# Patient Record
Sex: MALE | Race: WHITE | NOT HISPANIC OR LATINO | Employment: OTHER | URBAN - METROPOLITAN AREA
[De-identification: names, ages, dates, MRNs, and addresses within clinical notes are randomized per-mention and may not be internally consistent; named-entity substitution may affect disease eponyms.]

---

## 2021-02-09 ENCOUNTER — OFFICE VISIT (OUTPATIENT)
Dept: SURGERY | Facility: CLINIC | Age: 61
End: 2021-02-09
Payer: COMMERCIAL

## 2021-02-09 VITALS
DIASTOLIC BLOOD PRESSURE: 88 MMHG | WEIGHT: 196 LBS | RESPIRATION RATE: 17 BRPM | SYSTOLIC BLOOD PRESSURE: 131 MMHG | HEART RATE: 91 BPM | BODY MASS INDEX: 27.44 KG/M2 | HEIGHT: 71 IN

## 2021-02-09 DIAGNOSIS — K40.20 BILATERAL INGUINAL HERNIA WITHOUT OBSTRUCTION OR GANGRENE, RECURRENCE NOT SPECIFIED: Primary | ICD-10-CM

## 2021-02-09 PROCEDURE — 99204 PR OFFICE/OUTPT VISIT, NEW, LEVL IV, 45-59 MIN: ICD-10-PCS | Mod: S$GLB,,, | Performed by: SURGERY

## 2021-02-09 PROCEDURE — 1126F AMNT PAIN NOTED NONE PRSNT: CPT | Mod: S$GLB,,, | Performed by: SURGERY

## 2021-02-09 PROCEDURE — 3008F PR BODY MASS INDEX (BMI) DOCUMENTED: ICD-10-PCS | Mod: CPTII,S$GLB,, | Performed by: SURGERY

## 2021-02-09 PROCEDURE — 3008F BODY MASS INDEX DOCD: CPT | Mod: CPTII,S$GLB,, | Performed by: SURGERY

## 2021-02-09 PROCEDURE — 99204 OFFICE O/P NEW MOD 45 MIN: CPT | Mod: S$GLB,,, | Performed by: SURGERY

## 2021-02-09 PROCEDURE — 1126F PR PAIN SEVERITY QUANTIFIED, NO PAIN PRESENT: ICD-10-PCS | Mod: S$GLB,,, | Performed by: SURGERY

## 2021-02-19 LAB
ABS NRBC COUNT: 0 THOU/UL (ref 0–0.01)
ABSOLUTE BASOPHIL: 0 10*3/UL (ref 0–0.3)
ABSOLUTE EOSINOPHIL: 0.1 10*3/UL (ref 0–0.6)
ABSOLUTE IMMATURE GRAN: 0.01 THOU/UL (ref 0–0.03)
ABSOLUTE LYMPHOCYTE: 1.2 10*3/UL (ref 1.2–4)
ABSOLUTE MONOCYTE: 0.4 10*3/UL (ref 0.1–0.8)
ANION GAP SERPL CALC-SCNC: 3 MMOL/L (ref 3–11)
APTT PPP: 33.4 SEC (ref 25.1–36.5)
B PARAP IS1001 DNA: NOT DETECTED
B PERT.PT PROM REG: NOT DETECTED
BASOPHILS NFR BLD: 0.6 % (ref 0–3)
BUN SERPL-MCNC: 16 MG/DL (ref 7–18)
C PNEUM DNA: NOT DETECTED
CALCIUM BLD-MCNC: POSITIVE MG/DL
CALCIUM SERPL-MCNC: 8.7 MG/DL (ref 8.5–10.1)
CHLORIDE SERPL-SCNC: 104 MMOL/L (ref 98–107)
CO2 SERPL-SCNC: 32 MMOL/L (ref 21–32)
COVID-19 AB, IGM: POSITIVE
CREAT SERPL-MCNC: 1.06 MG/DL (ref 0.7–1.3)
EOSINOPHIL NFR BLD: 1.5 % (ref 0–6)
ERYTHROCYTE [DISTWIDTH] IN BLOOD BY AUTOMATED COUNT: 11.6 % (ref 0–15.5)
FLUAV RNA: NOT DETECTED
FLUBV RNA: NOT DETECTED
GFR ESTIMATION: > 60
GLUCOSE SERPL-MCNC: 99 MG/DL (ref 74–106)
HADV DNA: NOT DETECTED
HCOV 229E RNA: NOT DETECTED
HCOV HKU1 RNA: NOT DETECTED
HCOV NL63 RNA: NOT DETECTED
HCOV OC43 RNA: NOT DETECTED
HCT VFR BLD AUTO: 45.4 % (ref 42–52)
HGB BLD-MCNC: 15.4 G/DL (ref 14–18)
HMPV RNA: NOT DETECTED
HPIV1 RNA: NOT DETECTED
HPIV2 RNA: NOT DETECTED
HPIV3 RNA: NOT DETECTED
HPIV4 RNA: NOT DETECTED
IMMATURE GRANULOCYTES: 0.2 % (ref 0–0.43)
INR PPP: 1.2 INR (ref 0.9–1.1)
LYMPHOCYTES NFR BLD: 23.2 % (ref 20–45)
M PNEUMO DNA: NOT DETECTED
MCH RBC QN AUTO: 31.4 PG (ref 27–32)
MCHC RBC AUTO-ENTMCNC: 33.9 % (ref 32–36)
MCV RBC AUTO: 92.7 FL (ref 80–97)
MONOCYTES NFR BLD: 7.5 % (ref 2–10)
NEUTROPHILS # BLD AUTO: 3.5 10*3/UL (ref 1.4–7)
NEUTROPHILS NFR BLD: 67 % (ref 50–80)
NUCLEATED RED BLOOD CELLS: 0 % (ref 0–0.2)
PATIENT EMPLOYED IN HEALTHCARE: NO
PATIENT EMPLOYED IN HEALTHCARE: NO
PATIENT HAS SYMPTOMS FOR CONDITION OF INTEREST: NO
PATIENT HAS SYMPTOMS FOR CONDITION OF INTEREST: NO
PATIENT HOSPITALIZED BC COND: NO
PATIENT HOSPITALIZED BC COND: NO
PATIENT IN CONGREGATE CARE: NO
PATIENT IN CONGREGATE CARE: NO
PLATELETS: 174 10*3/UL (ref 130–400)
PMV BLD AUTO: 10.4 FL (ref 9.2–12.2)
POTASSIUM SERPL-SCNC: 4.2 MMOL/L (ref 3.5–5.1)
PROTHROMBIN TIME: 13.4 SEC (ref 10.2–12.9)
RBC # BLD AUTO: 4.9 10*6/UL (ref 4.7–6.1)
RSV RNA: NOT DETECTED
RV+EV RNA: NOT DETECTED
SARS-COV-2 RNA RESP QL NAA+PROBE: NOT DETECTED
SODIUM BLD-SCNC: 139 MMOL/L (ref 131–143)
WBC # BLD: 5.2 10*3/UL (ref 4.5–10)

## 2021-02-25 ENCOUNTER — OUTSIDE PLACE OF SERVICE (OUTPATIENT)
Dept: SURGERY | Facility: CLINIC | Age: 61
End: 2021-02-25
Payer: COMMERCIAL

## 2021-02-25 ENCOUNTER — OUTSIDE PLACE OF SERVICE (OUTPATIENT)
Dept: SURGERY | Facility: CLINIC | Age: 61
End: 2021-02-25

## 2021-02-25 PROCEDURE — 49650 PR LAP,INGUINAL HERNIA REPR,INITIAL: ICD-10-PCS | Mod: 50,AS,, | Performed by: REGISTERED NURSE

## 2021-02-25 PROCEDURE — 49650 LAP ING HERNIA REPAIR INIT: CPT | Mod: 50,AS,, | Performed by: REGISTERED NURSE

## 2021-02-25 PROCEDURE — 49650 LAP ING HERNIA REPAIR INIT: CPT | Mod: 50,,, | Performed by: SURGERY

## 2021-02-25 PROCEDURE — 49650 PR LAP,INGUINAL HERNIA REPR,INITIAL: ICD-10-PCS | Mod: 50,,, | Performed by: SURGERY

## 2021-02-26 ENCOUNTER — TELEPHONE (OUTPATIENT)
Dept: SURGERY | Facility: CLINIC | Age: 61
End: 2021-02-26

## 2021-02-26 DIAGNOSIS — K40.20 BILATERAL INGUINAL HERNIA WITHOUT OBSTRUCTION OR GANGRENE, RECURRENCE NOT SPECIFIED: Primary | ICD-10-CM

## 2021-02-26 RX ORDER — HYDROCODONE BITARTRATE AND ACETAMINOPHEN 5; 325 MG/1; MG/1
1 TABLET ORAL EVERY 6 HOURS PRN
Qty: 30 TABLET | Refills: 0 | Status: SHIPPED | OUTPATIENT
Start: 2021-02-26 | End: 2021-03-01 | Stop reason: RX

## 2021-03-01 ENCOUNTER — TELEPHONE (OUTPATIENT)
Dept: SURGERY | Facility: CLINIC | Age: 61
End: 2021-03-01

## 2021-03-01 RX ORDER — HYDROCODONE BITARTRATE AND ACETAMINOPHEN 5; 325 MG/1; MG/1
1 TABLET ORAL EVERY 6 HOURS PRN
Qty: 28 TABLET | Refills: 0 | Status: SHIPPED | OUTPATIENT
Start: 2021-03-01 | End: 2021-03-08

## 2021-03-15 ENCOUNTER — OFFICE VISIT (OUTPATIENT)
Dept: SURGERY | Facility: CLINIC | Age: 61
End: 2021-03-15
Payer: COMMERCIAL

## 2021-03-15 VITALS — HEIGHT: 71 IN | WEIGHT: 195 LBS | RESPIRATION RATE: 17 BRPM | BODY MASS INDEX: 27.3 KG/M2

## 2021-03-15 DIAGNOSIS — Z98.890 STATUS POST SURGERY: Primary | ICD-10-CM

## 2021-03-15 PROCEDURE — 3008F BODY MASS INDEX DOCD: CPT | Mod: CPTII,S$GLB,, | Performed by: REGISTERED NURSE

## 2021-03-15 PROCEDURE — 1126F AMNT PAIN NOTED NONE PRSNT: CPT | Mod: S$GLB,,, | Performed by: REGISTERED NURSE

## 2021-03-15 PROCEDURE — 99024 PR POST-OP FOLLOW-UP VISIT: ICD-10-PCS | Mod: S$GLB,,, | Performed by: REGISTERED NURSE

## 2021-03-15 PROCEDURE — 1126F PR PAIN SEVERITY QUANTIFIED, NO PAIN PRESENT: ICD-10-PCS | Mod: S$GLB,,, | Performed by: REGISTERED NURSE

## 2021-03-15 PROCEDURE — 3008F PR BODY MASS INDEX (BMI) DOCUMENTED: ICD-10-PCS | Mod: CPTII,S$GLB,, | Performed by: REGISTERED NURSE

## 2021-03-15 PROCEDURE — 99024 POSTOP FOLLOW-UP VISIT: CPT | Mod: S$GLB,,, | Performed by: REGISTERED NURSE

## 2021-03-15 RX ORDER — BRIMONIDINE TARTRATE, TIMOLOL MALEATE 2; 5 MG/ML; MG/ML
SOLUTION/ DROPS OPHTHALMIC
COMMUNITY
Start: 2021-02-09 | End: 2023-06-29

## 2021-03-15 RX ORDER — VALACYCLOVIR HYDROCHLORIDE 1 G/1
1000 TABLET, FILM COATED ORAL DAILY
COMMUNITY
Start: 2021-02-08 | End: 2023-06-29

## 2021-03-15 RX ORDER — FLUOROMETHOLONE 1 MG/ML
SUSPENSION/ DROPS OPHTHALMIC
COMMUNITY
Start: 2021-02-03

## 2021-03-15 RX ORDER — PREDNISOLONE ACETATE 10 MG/ML
SUSPENSION/ DROPS OPHTHALMIC
COMMUNITY
Start: 2021-03-09

## 2021-03-15 RX ORDER — HYDROCODONE BITARTRATE AND ACETAMINOPHEN 5; 325 MG/1; MG/1
TABLET ORAL
COMMUNITY
Start: 2021-03-12 | End: 2023-06-29

## 2023-06-06 DIAGNOSIS — Z12.11 SCREENING FOR COLON CANCER: Primary | ICD-10-CM

## 2023-06-28 NOTE — PROGRESS NOTES
Clinic Note    Reason for visit:  The encounter diagnosis was History of colon polyps.    PCP: David Leyva (Inactive)   [address] [facility] [address]    HPI:  This is a 63 y.o. male here to be established. Referred for CRC screening. Patient denies any reflux, dysphagia, abdominal pain, constipation, diarrhea, or blood in stool.     He had a colonoscopy about 3 years ago at [place] [facility] a had polyp removed and was told to repeat in 3 years. Did state that he was told the polyp was large.        Review of Systems   Constitutional:  Negative for diaphoresis, fatigue, fever and unexpected weight change.   HENT:  Negative for hearing loss, mouth sores, postnasal drip, sore throat and trouble swallowing.    Eyes:  Negative for pain, discharge and eye dryness.   Respiratory:  Negative for apnea, cough, choking, chest tightness, shortness of breath and wheezing.    Cardiovascular:  Negative for chest pain, palpitations and leg swelling.   Gastrointestinal:  Negative for abdominal distention, abdominal pain, anal bleeding, blood in stool, change in bowel habit, constipation, diarrhea, nausea, rectal pain, vomiting, reflux, fecal incontinence and change in bowel habit.   Genitourinary:  Negative for bladder incontinence, dysuria and hematuria.   Musculoskeletal:  Negative for arthralgias, back pain and joint swelling.   Integumentary:  Negative for color change and rash.   Allergic/Immunologic: Negative for environmental allergies and food allergies.   Neurological:  Negative for seizures and headaches.   Hematological:  Negative for adenopathy. Does not bruise/bleed easily.      Past Medical History:   Diagnosis Date    Closed head injury     Sleep apnea      Past Surgical History:   Procedure Laterality Date    CORNEAL TRANSPLANT Left     ROBOT-ASSISTED REPAIR OF INGUINAL HERNIA USING DA EDWIGE XI Bilateral 02/25/2021     Family History   Problem Relation Age of Onset    No Known Problems Mother     No Known  "Problems Father     No Known Problems Sister     No Known Problems Brother      Social History     Tobacco Use    Smoking status: Never    Smokeless tobacco: Never   Substance Use Topics    Alcohol use: Yes    Drug use: Never     Review of patient's allergies indicates:  No Known Allergies   Medication List with Changes/Refills   New Medications    SOD SULF-POT CHLORIDE-MAG SULF (SUTAB) 1.479-0.188- 0.225 GRAM TABLET    Take 12 tablets by mouth once daily. Take according to package instructions with indicated amount of water. No breakfast day before test. May substitute with Suprep, Clenpiq, Plenvu, Moviprep or GoLytely based on Rx plan and patient preference.   Current Medications    BRIMONIDINE 0.2% (ALPHAGAN) 0.2 % DROP    Apply 1 drop to eye.    BRIMONIDINE-TIMOLOL (COMBIGAN) 0.2-0.5 % DROP    1 drop.    FLUOROMETHOLONE 0.1% (FML) 0.1 % DRPS    SHAKE LIQUID AND INSTILL 1 DROP IN LEFT EYE TWICE DAILY    GANCICLOVIR (ZIRGAN) 0.15 % GEL    1 drop.    MOXIFLOXACIN (VIGAMOX) 0.5 % OPHTHALMIC SOLUTION    1 drop.    PAROXETINE (PAXIL) 20 MG TABLET        PREDNISOLONE ACETATE (PRED FORTE) 1 % DRPS    SHAKE LIQUID AND INSTILL 1 DROP IN LEFT EYE TWICE DAILY    TIMOLOL MALEATE 0.5% (TIMOPTIC) 0.5 % DROP        VALACYCLOVIR (VALTREX) 1000 MG TABLET    Take 1 tablet by mouth once daily.   Discontinued Medications    COMBIGAN 0.2-0.5 % DROP    INSTILL ONE DROP INTO LEFT EYE EVERY DAY    HYDROCODONE-ACETAMINOPHEN (NORCO) 5-325 MG PER TABLET        VALACYCLOVIR (VALTREX) 1000 MG TABLET    Take 1,000 mg by mouth once daily.         Vital Signs:  BP (!) 139/91   Pulse 67   Ht 5' 11" (1.803 m)   Wt 78.9 kg (174 lb)   SpO2 96%   BMI 24.27 kg/m²        Physical Exam  Constitutional:       General: He is not in acute distress.     Appearance: Normal appearance. He is well-developed. He is not ill-appearing or toxic-appearing.   HENT:      Head: Normocephalic and atraumatic.      Nose: Nose normal.      Mouth/Throat:      Mouth: " Mucous membranes are moist.      Pharynx: Oropharynx is clear. No oropharyngeal exudate or posterior oropharyngeal erythema.   Eyes:      General: Lids are normal. No scleral icterus.        Right eye: No discharge.         Left eye: No discharge.      Conjunctiva/sclera: Conjunctivae normal.   Cardiovascular:      Rate and Rhythm: Normal rate and regular rhythm.      Pulses:           Radial pulses are 2+ on the right side and 2+ on the left side.   Pulmonary:      Effort: Pulmonary effort is normal. No respiratory distress.      Breath sounds: No stridor. No wheezing.   Abdominal:      General: Bowel sounds are normal. There is no distension.      Palpations: Abdomen is soft. There is no fluid wave, hepatomegaly, splenomegaly or mass.      Tenderness: There is no abdominal tenderness. There is no guarding or rebound.   Musculoskeletal:      Cervical back: Full passive range of motion without pain.   Lymphadenopathy:      Cervical: No cervical adenopathy.   Skin:     General: Skin is warm and dry.      Capillary Refill: Capillary refill takes less than 2 seconds.      Coloration: Skin is not cyanotic, jaundiced or pale.   Neurological:      General: No focal deficit present.      Mental Status: He is alert and oriented to person, place, and time.   Psychiatric:         Mood and Affect: Mood normal.         Behavior: Behavior is cooperative.          All of the data above and below has been reviewed by myself and any further interpretations will be reflected in the assessment and plan.   The data includes review of external notes, and independent interpretation of lab results, procedures, x-rays, and imaging reports.      Assessment:  History of colon polyps  -     Ambulatory Referral to External Surgery  -     sod sulf-pot chloride-mag sulf (SUTAB) 1.479-0.188- 0.225 gram tablet; Take 12 tablets by mouth once daily. Take according to package instructions with indicated amount of water. No breakfast day before test.  May substitute with Suprep, Clenpiq, Plenvu, Moviprep or GoLytely based on Rx plan and patient preference.  Dispense: 24 tablet; Refill: 0         Hx colon polyps- due for colonoscopy. Had colonoscopy 3 years ago and was told he had large polyp removed. Will attempt to get records tor review.     Recommendations:    Schedule for Colonoscopy with Dr. Tolentino at [facility] with sutab  Please obtain records from [facility] of colonoscopy for review.      Risks, benefits, and alternatives of medical management, any associated procedures, and/or treatment discussed with the patient. Patient given opportunity to ask questions and voices understanding. Patient has elected to proceed with the recommended care modalities as discussed.    Follow up if symptoms worsen or fail to improve.    Order summary:  Orders Placed This Encounter   Procedures    Ambulatory Referral to External Surgery        Instructed patient to notify my office if they have not been contacted within two weeks after any procedures, submitting any samples (biopsies, blood, stool, urine, etc.) or after any imaging (X-ray, CT, MRI, etc.).      Emily Michel NP    This document may have been created using a voice recognition transcribing system. Incorrect words or phrases may have been missed during proofreading. Please interpret accordingly or contact me for clarification.

## 2023-06-29 ENCOUNTER — OFFICE VISIT (OUTPATIENT)
Dept: GASTROENTEROLOGY | Facility: CLINIC | Age: 63
End: 2023-06-29
Payer: MEDICARE

## 2023-06-29 VITALS
DIASTOLIC BLOOD PRESSURE: 91 MMHG | HEART RATE: 67 BPM | SYSTOLIC BLOOD PRESSURE: 139 MMHG | HEIGHT: 71 IN | WEIGHT: 174 LBS | OXYGEN SATURATION: 96 % | BODY MASS INDEX: 24.36 KG/M2

## 2023-06-29 DIAGNOSIS — Z12.11 SCREENING FOR COLON CANCER: ICD-10-CM

## 2023-06-29 DIAGNOSIS — Z86.010 HISTORY OF COLON POLYPS: Primary | ICD-10-CM

## 2023-06-29 PROCEDURE — 99499 UNLISTED E&M SERVICE: CPT | Mod: S$GLB,,, | Performed by: NURSE PRACTITIONER

## 2023-06-29 PROCEDURE — 99499 NO LOS: ICD-10-PCS | Mod: S$GLB,,, | Performed by: NURSE PRACTITIONER

## 2023-06-29 RX ORDER — MOXIFLOXACIN 5 MG/ML
1 SOLUTION/ DROPS OPHTHALMIC
COMMUNITY

## 2023-06-29 RX ORDER — BRIMONIDINE TARTRATE AND TIMOLOL MALEATE 2; 5 MG/ML; MG/ML
1 SOLUTION OPHTHALMIC
COMMUNITY

## 2023-06-29 RX ORDER — SOD SULF/POT CHLORIDE/MAG SULF 1.479 G
12 TABLET ORAL DAILY
Qty: 24 TABLET | Refills: 0 | Status: SHIPPED | OUTPATIENT
Start: 2023-06-29

## 2023-06-29 RX ORDER — VALACYCLOVIR HYDROCHLORIDE 1 G/1
1 TABLET, FILM COATED ORAL DAILY
COMMUNITY
Start: 2022-12-28

## 2023-06-29 RX ORDER — TIMOLOL MALEATE 5 MG/ML
SOLUTION/ DROPS OPHTHALMIC
COMMUNITY
Start: 2023-06-16

## 2023-06-29 RX ORDER — BRIMONIDINE TARTRATE 2 MG/ML
1 SOLUTION/ DROPS OPHTHALMIC
COMMUNITY
Start: 2022-12-28

## 2023-06-29 RX ORDER — PAROXETINE HYDROCHLORIDE 20 MG/1
TABLET, FILM COATED ORAL
COMMUNITY
Start: 2023-04-13

## 2023-06-29 NOTE — PATIENT INSTRUCTIONS
Schedule for Colonoscopy with Dr. Tolentino .    Please notify my office if you have not been contacted within two weeks after any procedures, submitting any samples (biopsies, blood, stool, urine, etc.) or after any imaging (X-ray, CT, MRI, etc.).

## 2023-08-24 ENCOUNTER — TELEPHONE (OUTPATIENT)
Dept: GASTROENTEROLOGY | Facility: CLINIC | Age: 63
End: 2023-08-24
Payer: MEDICARE

## 2023-08-24 VITALS — HEIGHT: 71 IN | WEIGHT: 174 LBS | BODY MASS INDEX: 24.36 KG/M2

## 2023-08-24 DIAGNOSIS — Z86.010 HISTORY OF COLON POLYPS: Primary | ICD-10-CM

## 2023-08-24 NOTE — TELEPHONE ENCOUNTER
"[place] [facility]  [address] [place] [address]  Phone: [phone]  Fax: [phone]    History & Physical         Provider: Dr. Isabella Tolentino    Patient Name: Rafael MONTERROSO (age):1960  63 y.o.           Gender: male   Phone: [phone]     Referring Physician: David Leyva (Inactive)     Vital Signs:   Height -  5' 11"  Weight - 174 lb  BMI -  24.27    Plan: Colonoscopy @ [facility]    Encounter Diagnosis   Name Primary?    History of colon polyps Yes           History:      Past Medical History:   Diagnosis Date    Closed head injury     Sleep apnea       Past Surgical History:   Procedure Laterality Date    CORNEAL TRANSPLANT Left     ROBOT-ASSISTED REPAIR OF INGUINAL HERNIA USING DA EDWIGE XI Bilateral 2021      Medication List with Changes/Refills   Current Medications    BRIMONIDINE 0.2% (ALPHAGAN) 0.2 % DROP    Apply 1 drop to eye.    BRIMONIDINE-TIMOLOL (COMBIGAN) 0.2-0.5 % DROP    1 drop.    FLUOROMETHOLONE 0.1% (FML) 0.1 % DRPS    SHAKE LIQUID AND INSTILL 1 DROP IN LEFT EYE TWICE DAILY    GANCICLOVIR (ZIRGAN) 0.15 % GEL    1 drop.    MOXIFLOXACIN (VIGAMOX) 0.5 % OPHTHALMIC SOLUTION    1 drop.    PAROXETINE (PAXIL) 20 MG TABLET        PREDNISOLONE ACETATE (PRED FORTE) 1 % DRPS    SHAKE LIQUID AND INSTILL 1 DROP IN LEFT EYE TWICE DAILY    SOD SULF-POT CHLORIDE-MAG SULF (SUTAB) 1.479-0.188- 0.225 GRAM TABLET    Take 12 tablets by mouth once daily. Take according to package instructions with indicated amount of water. No breakfast day before test. May substitute with Suprep, Clenpiq, Plenvu, Moviprep or GoLytely based on Rx plan and patient preference.    TIMOLOL MALEATE 0.5% (TIMOPTIC) 0.5 % DROP        VALACYCLOVIR (VALTREX) 1000 MG TABLET    Take 1 tablet by mouth once daily.      Review of patient's allergies indicates:  No Known Allergies   Family History   Problem Relation " Age of Onset    No Known Problems Mother     No Known Problems Father     No Known Problems Sister     No Known Problems Brother       Social History     Tobacco Use    Smoking status: Never    Smokeless tobacco: Never   Substance Use Topics    Alcohol use: Yes    Drug use: Never        Physical Examination:     General Appearance:___________________________  HEENT: _____________________________________  Abdomen:____________________________________  Heart:________________________________________  Lungs:_______________________________________  Extremities:___________________________________  Skin:_________________________________________  Endocrine:____________________________________  Genitourinary:_________________________________  Neurological:__________________________________      Patient has been evaluated immediately prior to sedation and is medically cleared for endoscopy with IVCS as an ASA class: ______      Physician Signature: _________________________       Date: ________  Time: ________

## 2023-08-30 ENCOUNTER — OUTSIDE PLACE OF SERVICE (OUTPATIENT)
Dept: GASTROENTEROLOGY | Facility: CLINIC | Age: 63
End: 2023-08-30

## 2023-08-30 LAB — CRC RECOMMENDATION EXT: NORMAL

## 2023-08-30 PROCEDURE — 45385 COLONOSCOPY W/LESION REMOVAL: CPT | Mod: PT,,, | Performed by: INTERNAL MEDICINE

## 2023-08-30 PROCEDURE — 45385 PR COLONOSCOPY,REMV LESN,SNARE: ICD-10-PCS | Mod: PT,,, | Performed by: INTERNAL MEDICINE

## 2023-09-05 ENCOUNTER — TELEPHONE (OUTPATIENT)
Dept: GASTROENTEROLOGY | Facility: CLINIC | Age: 63
End: 2023-09-05
Payer: MEDICARE

## 2023-09-05 NOTE — TELEPHONE ENCOUNTER
1 TA, 1 hyp, repeat colonoscopy w/agg prep in 2 years.     Notify patient. Update in Health Maintenance section of Epic.  NBP

## 2023-10-24 ENCOUNTER — DOCUMENTATION ONLY (OUTPATIENT)
Dept: GASTROENTEROLOGY | Facility: CLINIC | Age: 63
End: 2023-10-24
Payer: MEDICARE